# Patient Record
(demographics unavailable — no encounter records)

---

## 2017-04-25 NOTE — RAD
CT chest without IV contrast  



History:  Pleural effusion and infiltrates.



Comparison:  None.



Technique:  Helical CT of the chest was performed without intravenous

contrast.  Axial, sagittal, and coronal  reconstructions were obtained.



One or more of the following individualized dose reduction techniques were

utilized for the study:



Automated exposure control

Adjustment of mA and/or kV according to patient's size

Use of iterative reconstruction technique.



Findings:  



Thyroid is symmetric. Trachea and mainstem bronchi appear patent. Aortic

atherosclerosis is present. Coronary artery calcifications are present.

Cardiac chambers appear enlarged. No pericardial thickening is identified.



Small bilateral pleural effusions, right larger than left, are seen. Bilateral

lower lobe consolidation is favored to be atelectasis. There is no evidence of

mild septal thickening, raising possibility of mild interstitial edema.



The azygos vein is enlarged which is thought to be representative of azygos

continuation of the IVC..



Impression:  

1.  Small bilateral pleural effusions, right larger the left.

2.  Bilateral lower lobe consolidation, favored to be atelectasis.

3.  There may be component of mild interstitial edema.

## 2017-04-25 NOTE — RAD
Exam:  PA and lateral chest radiograph  



History:  Congestive heart failure.



Comparison:  3/5/2006.



Findings:  Cardiac silhouette appears enlarged. Aortic atherosclerosis is

seen. Small bilateral pleural effusions are noted, right larger than left.

There is evidence of thickening of the fissure and increased vascularity,

compatible with mild congestive heart failure.  Bilateral lower lobe densities

are seen adjacent to the pleural fluid, could be atelectasis.



Impression: Mild congestive heart failure with bilateral pleural effusions.

## 2017-04-26 NOTE — PDOC2
CARDIAC CONSULT


DATE OF CONSULT


Date of Consult


DATE: 4/26/17 


TIME: 11:24





REASON FOR CONSULT


Reason for Consult:


AFIB





REFERRING PHYSICIAN


Referring Physician:


Xander





SOURCE


Source:  Chart review, Patient





HISTORY OF PRESENT ILLNESS


HISTORY OF PRESENT ILLNESS


This is a pleasant 81 yo female admitted for complains of SOA.  Reports that in 

the last 1-2 weeks she has been developing increasing SOA and ACOSTA.  Recently 

her symptoms have been worse with notable orthopnea, dry cough, and increasing 

leg swelling. Denies any CP, palpitations, dizziness or fainting episodes. She 

has been compliant with her daily medications.  Denies any any excessive fluid 

intake.  She has chronic AFIB and is on coumadin.  She went ot her PCP 

yesterday and was told to come to the hospital due to CHF. Denies any CAD, VTE, 

syncope. Verbalized that her BP have been erratic "up and down"





PAST MEDICAL HISTORY


Cardiovascular:  AFIB, HTN, Hyperlipidemia


Pulmonary:  No pertinent hx


CENTRAL NERVOUS SYSTEM:  Other (No pertinent history)


GI:  Diverticulosis, GERD, Other (esophageal stricture)


Heme/Onc:  No pertinent hx


Hepatobiliary:  No pertinent hx


Psych:  No pertinent hx


Musculoskeletal:  Osteoarthritis


Infectious disease:  No pertinent hx


ENT:  Other (blind right eye)


Renal/:  Chronic renal insuff


Endocrine:  Diabetes (2)


Dermatology:  No pertinent hx





PAST SURGICAL HISTORY


Past Surgical History:  Cataract Removal, Other (benign colon polypectomy; 

right eye surgery; [facility] 2000 no significant lesions at that time; esophageal 

dilatation)





FAMILY HISTORY


Family History:  Coronary Artery Disease (sister), Diabetes (brother), Stroke (

sister)





SOCIAL HISTORY


Smoke:  No


ALCOHOL:  none


Drugs:  None


Lives:  with Family





CURRENT MEDICATIONS


CURRENT MEDICATIONS





Current Medications








 Medications


  (Trade)  Dose


 Ordered  Sig/Navjot


 Route


 PRN Reason  Start Time


 Stop Time Status Last Admin


Dose Admin


 


 Amlodipine


 Besylate


  (Norvasc)  10 mg  DAILY


 PO


   4/26/17 09:00


    4/26/17 08:11


 


 


 Atenolol


  (Tenormin)  50 mg  BID


 PO


   4/25/17 21:00


    4/26/17 08:11


 


 


 Atorvastatin


 Calcium


  (Lipitor)  40 mg  QHS


 PO


   4/25/17 21:00


    4/25/17 20:43


 


 


 Glipizide


  (Glucotrol)  5 mg  DAILYWBKFT


 PO


   4/26/17 08:00


    4/26/17 08:09


 


 


 Warfarin Sodium


  (Coumadin)  2 mg  DAILY16


 PO


   4/25/17 17:30


    4/25/17 18:32


 


 


 Losartan Potassium


  (Cozaar)  100 mg  DAILY


 PO


   4/26/17 09:00


    4/26/17 08:10


 


 


 Furosemide


  (Lasix)  40 mg  DAILY


 IVP


   4/26/17 09:00


    4/26/17 08:09


 


 


 Furosemide


  (Lasix)  40 mg  1X  ONCE


 IVP


   4/25/17 18:15


 4/25/17 18:16 DC 4/25/17 18:33


 


 


 Potassium Chloride


  (Klor-Con)  20 meq  DAILY10


 PO


   4/26/17 10:00


    4/26/17 10:16


 


 


 Aspirin


  (Children'S


 Aspirin)  81 mg  1X  ONCE


 PO


   4/26/17 10:30


 4/26/17 10:31 DC 4/26/17 10:26


 











ALLERGIES


ALLERGIES:  


Coded Allergies:  


     latex (Verified  Allergy, Intermediate, RASH, 4/25/17)


     felodipine (Verified  Adverse Reaction, Mild, COUGH, 4/25/17)


     lisinopril (Verified  Adverse Reaction, Mild, COUGH, 4/25/17)





ROS


Review of System


14 point ROS evaluated with pertinent positives noted per HPI





PHYSICAL EXAM


General:  Alert, Oriented X3, Cooperative, No acute distress


HEENT:  Atraumatic, Mucous membr. moist/pink


Lungs:  Other (bibasilar crackles)


Extremities:  No cyanosis, Other (1+ bilateral LE pitting edema)


Skin:  No breakdown, No significant lesion


Neuro:  Normal speech, Sensation intact


Psych/Mental Status:  Mental status NL, Mood NL


MUSCULOSKELETAL:  Osteoarthritic changes both hands





VITALS


VITALS





 Vital Signs








  Date Time  Temp Pulse Resp B/P Pulse Ox O2 Delivery O2 Flow Rate FiO2


 


4/26/17 08:11  67  132/66    


 


4/26/17 08:00      Nasal Cannula 3.0 


 


4/26/17 07:20 98.2  16  96   





 98.2       











LABS


Lab:





Laboratory Tests








Test


  4/25/17


15:30 4/25/17


16:24 4/25/17


20:26 4/26/17


06:00


 


Glucose (Fingerstick)


  91mg/dL


(70-99) 


  162mg/dL


(70-99) 


 


 


White Blood Count


  


  7.2x10^3/uL


(4.0-11.0) 


  6.3x10^3/uL


(4.0-11.0)


 


Red Blood Count


  


  3.59x10^6/uL


(3.50-5.40) 


  3.39x10^6/uL


(3.50-5.40)


 


Hemoglobin


  


  11.7g/dL


(12.0-15.5) 


  11.1g/dL


(12.0-15.5)


 


Hematocrit


  


  35.0%


(36.0-47.0) 


  32.8%


(36.0-47.0)


 


Mean Corpuscular Volume  97fL ()   97fL () 


 


Mean Corpuscular Hemoglobin  33pg (25-35)   33pg (25-35) 


 


Mean Corpuscular Hemoglobin


Concent 


  34g/dL (31-37) 


  


  34g/dL (31-37) 


 


 


Red Cell Distribution Width


  


  15.1%


(11.5-14.5) 


  15.2%


(11.5-14.5)


 


Platelet Count


  


  355x10^3/uL


(140-400) 


  307x10^3/uL


(140-400)


 


Neutrophils (%) (Auto)  71% (31-73)   62% (31-73) 


 


Lymphocytes (%) (Auto)  19% (24-48)   24% (24-48) 


 


Monocytes (%) (Auto)  8% (0-9)   12% (0-9) 


 


Eosinophils (%) (Auto)  1% (0-3)   2% (0-3) 


 


Basophils (%) (Auto)  0% (0-3)   1% (0-3) 


 


Neutrophils # (Auto)


  


  5.1x10^3uL


(1.8-7.7) 


  3.9x10^3uL


(1.8-7.7)


 


Lymphocytes # (Auto)


  


  1.4x10^3/uL


(1.0-4.8) 


  1.5x10^3/uL


(1.0-4.8)


 


Monocytes # (Auto)


  


  0.6x10^3/uL


(0.0-1.1) 


  0.7x10^3/uL


(0.0-1.1)


 


Eosinophils # (Auto)


  


  0.1x10^3/uL


(0.0-0.7) 


  0.1x10^3/uL


(0.0-0.7)


 


Basophils # (Auto)


  


  0.0x10^3/uL


(0.0-0.2) 


  0.0x10^3/uL


(0.0-0.2)


 


Prothrombin Time


  


  23.8SEC


(11.7-14.0) 


  26.2SEC


(11.7-14.0)


 


Prothromb Time International


Ratio 


  2.3 (0.8-1.1) 


  


  2.6 (0.8-1.1) 


 


 


D-Dimer (Ning)


  


  5.10ug/mlFEU


(0.00-0.50) 


  


 


 


Sodium Level


  


  141mmol/L


(136-145) 


  144mmol/L


(136-145)


 


Potassium Level


  


  3.8mmol/L


(3.5-5.1) 


  3.7mmol/L


(3.5-5.1)


 


Chloride Level


  


  105mmol/L


() 


  106mmol/L


()


 


Carbon Dioxide Level


  


  23mmol/L


(21-32) 


  28mmol/L


(21-32)


 


Anion Gap  13 (6-14)   10 (6-14) 


 


Blood Urea Nitrogen  21mg/dL (7-20)   17mg/dL (7-20) 


 


Creatinine


  


  0.9mg/dL


(0.6-1.0) 


  0.9mg/dL


(0.6-1.0)


 


Estimated GFR


(Cockcroft-Gault) 


  59.9 


  


  59.9 


 


 


BUN/Creatinine Ratio  23 (6-20)   


 


Glucose Level


  


  100mg/dL


(70-99) 


  102mg/dL


(70-99)


 


Calcium Level


  


  9.2mg/dL


(8.5-10.1) 


  8.7mg/dL


(8.5-10.1)


 


Magnesium Level


  


  1.4mg/dL


(1.8-2.4) 


  


 


 


Total Bilirubin


  


  0.9mg/dL


(0.2-1.0) 


  


 


 


Aspartate Amino Transf


(AST/SGOT) 


  19U/L (15-37) 


  


  


 


 


Alanine Aminotransferase


(ALT/SGPT) 


  23U/L (14-59) 


  


  


 


 


Alkaline Phosphatase  88U/L ()   


 


Troponin I Quantitative


  


  < 0.017ng/mL


(0.000-0.055) 


  < 0.017ng/mL


(0.000-0.055)


 


NT-Pro-B-Type Natriuretic


Peptide 


  2462pg/mL


(0-449) 


  


 


 


Total Protein


  


  7.9g/dL


(6.4-8.2) 


  


 


 


Albumin


  


  4.0g/dL


(3.4-5.0) 


  


 


 


Albumin/Globulin Ratio  1.0 (1.0-1.7)   











ASSESSMENT/PLAN


ASSESSMENT/PLAN


1. Acute CHF possible diastolic dysfunction


2. Chronic AFIB: rate controlled. INR therapeutic. Troponin series normal. Was 

not on tele overnight


3. HTN: initially labile now better. 


4. HLP: statin





Recommendations


1. Continue with atenolol and coumadin


2. Could not rule out any underlying KIEL. paroxysmal RVR episode is another 

differential but no notable symptoms corresponding to it. 


   Uncontrolled HTN is another differential.  Will await TTE result and will 

make further recommendations. 


3. Good UOP, continue with IV lasix therapy. 


4. TSH, EKG pending, replace tele


Problems:  








MARGARITA SINGER Apr 26, 2017 11:25

## 2017-04-26 NOTE — RAD
Bilateral lower extremity venous ultrasound, 4/25/2017:



History: Leg swelling



Duplex evaluation of the deep veins in the lower extremities was performed

including grayscale, color-flow and spectral Doppler analysis. The femoral and

popliteal veins demonstrate normal compressibility and normal responses to

distal augmentation maneuvers.  Color imaging of those vessels shows no

evidence of intraluminal clot.  The visualized deep veins in both calves are

patent. 



IMPRESSION:   There is no sonographic evidence of deep vein thrombosis in

either lower extremity.

## 2017-04-26 NOTE — PDOC
PULMONARY PROGRESS NOTES


Vitals





 Vital Signs








  Date Time  Temp Pulse Resp B/P Pulse Ox O2 Delivery O2 Flow Rate FiO2


 


4/26/17 15:00 96.6 73 22 137/66 97 Nasal Cannula 4.0 





 96.6       








Labs





Laboratory Tests








Test


  4/25/17


15:30 4/25/17


16:24 4/25/17


20:26 4/26/17


06:00


 


Glucose (Fingerstick)


  91mg/dL


(70-99) 


  162mg/dL


(70-99) 


 


 


White Blood Count


  


  7.2x10^3/uL


(4.0-11.0) 


  6.3x10^3/uL


(4.0-11.0)


 


Red Blood Count


  


  3.59x10^6/uL


(3.50-5.40) 


  3.39x10^6/uL


(3.50-5.40)


 


Hemoglobin


  


  11.7g/dL


(12.0-15.5) 


  11.1g/dL


(12.0-15.5)


 


Hematocrit


  


  35.0%


(36.0-47.0) 


  32.8%


(36.0-47.0)


 


Mean Corpuscular Volume  97fL ()   97fL () 


 


Mean Corpuscular Hemoglobin  33pg (25-35)   33pg (25-35) 


 


Mean Corpuscular Hemoglobin


Concent 


  34g/dL (31-37) 


  


  34g/dL (31-37) 


 


 


Red Cell Distribution Width


  


  15.1%


(11.5-14.5) 


  15.2%


(11.5-14.5)


 


Platelet Count


  


  355x10^3/uL


(140-400) 


  307x10^3/uL


(140-400)


 


Neutrophils (%) (Auto)  71% (31-73)   62% (31-73) 


 


Lymphocytes (%) (Auto)  19% (24-48)   24% (24-48) 


 


Monocytes (%) (Auto)  8% (0-9)   12% (0-9) 


 


Eosinophils (%) (Auto)  1% (0-3)   2% (0-3) 


 


Basophils (%) (Auto)  0% (0-3)   1% (0-3) 


 


Neutrophils # (Auto)


  


  5.1x10^3uL


(1.8-7.7) 


  3.9x10^3uL


(1.8-7.7)


 


Lymphocytes # (Auto)


  


  1.4x10^3/uL


(1.0-4.8) 


  1.5x10^3/uL


(1.0-4.8)


 


Monocytes # (Auto)


  


  0.6x10^3/uL


(0.0-1.1) 


  0.7x10^3/uL


(0.0-1.1)


 


Eosinophils # (Auto)


  


  0.1x10^3/uL


(0.0-0.7) 


  0.1x10^3/uL


(0.0-0.7)


 


Basophils # (Auto)


  


  0.0x10^3/uL


(0.0-0.2) 


  0.0x10^3/uL


(0.0-0.2)


 


Prothrombin Time


  


  23.8SEC


(11.7-14.0) 


  26.2SEC


(11.7-14.0)


 


Prothromb Time International


Ratio 


  2.3 (0.8-1.1) 


  


  2.6 (0.8-1.1) 


 


 


D-Dimer (Ning)


  


  5.10ug/mlFEU


(0.00-0.50) 


  


 


 


Sodium Level


  


  141mmol/L


(136-145) 


  144mmol/L


(136-145)


 


Potassium Level


  


  3.8mmol/L


(3.5-5.1) 


  3.7mmol/L


(3.5-5.1)


 


Chloride Level


  


  105mmol/L


() 


  106mmol/L


()


 


Carbon Dioxide Level


  


  23mmol/L


(21-32) 


  28mmol/L


(21-32)


 


Anion Gap  13 (6-14)   10 (6-14) 


 


Blood Urea Nitrogen  21mg/dL (7-20)   17mg/dL (7-20) 


 


Creatinine


  


  0.9mg/dL


(0.6-1.0) 


  0.9mg/dL


(0.6-1.0)


 


Estimated GFR


(Cockcroft-Gault) 


  59.9 


  


  59.9 


 


 


BUN/Creatinine Ratio  23 (6-20)   


 


Glucose Level


  


  100mg/dL


(70-99) 


  102mg/dL


(70-99)


 


Calcium Level


  


  9.2mg/dL


(8.5-10.1) 


  8.7mg/dL


(8.5-10.1)


 


Magnesium Level


  


  1.4mg/dL


(1.8-2.4) 


  


 


 


Total Bilirubin


  


  0.9mg/dL


(0.2-1.0) 


  


 


 


Aspartate Amino Transf


(AST/SGOT) 


  19U/L (15-37) 


  


  


 


 


Alanine Aminotransferase


(ALT/SGPT) 


  23U/L (14-59) 


  


  


 


 


Alkaline Phosphatase  88U/L ()   


 


Troponin I Quantitative


  


  < 0.017ng/mL


(0.000-0.055) 


  < 0.017ng/mL


(0.000-0.055)


 


NT-Pro-B-Type Natriuretic


Peptide 


  2462pg/mL


(0-449) 


  


 


 


Total Protein


  


  7.9g/dL


(6.4-8.2) 


  


 


 


Albumin


  


  4.0g/dL


(3.4-5.0) 


  


 


 


Albumin/Globulin Ratio  1.0 (1.0-1.7)   














Test


  4/26/17


08:07 4/26/17


11:36 4/26/17


15:00 4/26/17


16:11


 


Glucose (Fingerstick)


  96mg/dL


(70-99) 118mg/dL


(70-99) 


  94mg/dL


(70-99)


 


Troponin I Quantitative


  


  


  < 0.017ng/mL


(0.000-0.055) 


 








Laboratory Tests








Test


  4/25/17


20:26 4/26/17


06:00 4/26/17


08:07 4/26/17


11:36


 


Glucose (Fingerstick)


  162mg/dL


(70-99) 


  96mg/dL


(70-99) 118mg/dL


(70-99)


 


White Blood Count


  


  6.3x10^3/uL


(4.0-11.0) 


  


 


 


Red Blood Count


  


  3.39x10^6/uL


(3.50-5.40) 


  


 


 


Hemoglobin


  


  11.1g/dL


(12.0-15.5) 


  


 


 


Hematocrit


  


  32.8%


(36.0-47.0) 


  


 


 


Mean Corpuscular Volume  97fL ()   


 


Mean Corpuscular Hemoglobin  33pg (25-35)   


 


Mean Corpuscular Hemoglobin


Concent 


  34g/dL (31-37) 


  


  


 


 


Red Cell Distribution Width


  


  15.2%


(11.5-14.5) 


  


 


 


Platelet Count


  


  307x10^3/uL


(140-400) 


  


 


 


Neutrophils (%) (Auto)  62% (31-73)   


 


Lymphocytes (%) (Auto)  24% (24-48)   


 


Monocytes (%) (Auto)  12% (0-9)   


 


Eosinophils (%) (Auto)  2% (0-3)   


 


Basophils (%) (Auto)  1% (0-3)   


 


Neutrophils # (Auto)


  


  3.9x10^3uL


(1.8-7.7) 


  


 


 


Lymphocytes # (Auto)


  


  1.5x10^3/uL


(1.0-4.8) 


  


 


 


Monocytes # (Auto)


  


  0.7x10^3/uL


(0.0-1.1) 


  


 


 


Eosinophils # (Auto)


  


  0.1x10^3/uL


(0.0-0.7) 


  


 


 


Basophils # (Auto)


  


  0.0x10^3/uL


(0.0-0.2) 


  


 


 


Prothrombin Time


  


  26.2SEC


(11.7-14.0) 


  


 


 


Prothromb Time International


Ratio 


  2.6 (0.8-1.1) 


  


  


 


 


Sodium Level


  


  144mmol/L


(136-145) 


  


 


 


Potassium Level


  


  3.7mmol/L


(3.5-5.1) 


  


 


 


Chloride Level


  


  106mmol/L


() 


  


 


 


Carbon Dioxide Level


  


  28mmol/L


(21-32) 


  


 


 


Anion Gap  10 (6-14)   


 


Blood Urea Nitrogen  17mg/dL (7-20)   


 


Creatinine


  


  0.9mg/dL


(0.6-1.0) 


  


 


 


Estimated GFR


(Cockcroft-Gault) 


  59.9 


  


  


 


 


Glucose Level


  


  102mg/dL


(70-99) 


  


 


 


Calcium Level


  


  8.7mg/dL


(8.5-10.1) 


  


 


 


Troponin I Quantitative


  


  < 0.017ng/mL


(0.000-0.055) 


  


 














Test


  4/26/17


15:00 4/26/17


16:11 


  


 


 


Troponin I Quantitative


  < 0.017ng/mL


(0.000-0.055) 


  


  


 


 


Glucose (Fingerstick)


  


  94mg/dL


(70-99) 


  


 








Medications





Active Scripts








 Medications  Dose


 Route/Sig Days Date Category Dose


Instructions


 


 Tylenol


  (Acetaminophen)


 325 Mg Tablet  1-2 Tab


 PO PRN Q4HRS   4/25/17 Reported 


 


 Coumadin


  (Warfarin Sodium)


 2 Mg Tablet  2 Mg


 PO DAILY   4/25/17 Reported 


 


 Atorvastatin


 Calcium 40 Mg


 Tablet  40 Mg


 PO DAILY   4/25/17 Reported 


 


 Losartan


 Potassium 100 Mg


 Tablet  100 Mg


 PO DAILY   4/25/17 Reported 


 


 Glipizide 5 Mg


 Tablet  5 Mg


 PO DAILY   4/25/17 Reported 


 


 Atenolol 50 Mg


 Tablet  50 Mg


 PO BID   4/25/17 Reported 


 


 Amlodipine


 Besylate 10 Mg


 Tablet  10 Mg


 PO DAILY   4/25/17 Reported 


 


 Furosemide 40


 Mg/4 Ml Solution  40 Mg


 INJ DAILY   4/25/17 Reported  FIRST DOSE NOW











Impression


.


FULL NOTE DICTATED


THANKS


AGREE WITH CURRENT RX


NEEDS AN OUTPT SLEEP STUDY








FRANK CHEN MD Apr 26, 2017 17:33

## 2017-04-26 NOTE — PDOC
Provider Note


Provider Note


history and physical dictated  #  [id number]








ADRIANA TENORIO MD Apr 26, 2017 09:33

## 2017-04-26 NOTE — CARD
--------------- APPROVED REPORT --------------





EXAM: Two-dimensional and M-mode echocardiogram with Doppler and color Doppler.



Other Information 

Quality : AverageHR: 69bpm

Rhythm : Atrial Fibrillation



INDICATION

Congestive Heart Failure 



RISK FACTORS

Obesity   



2D DIMENSIONS 

RVDd3.2 (2.9-3.5cm)Left Atrium(2D)4.3 (1.6-4.0cm)

IVSd1.1 (0.7-1.1cm)Aortic Root(2D)2.8 (2.0-3.7cm)

LVDd4.4 (3.9-5.9cm)LVOT Diameter2.0 (1.8-2.4cm)

PWd1.0 (0.7-1.1cm)LVDs3.2 (2.5-4.0cm)

FS (%) 27.5 %SV47.1 ml

LVEF(%)53.6 (>50%)



Aortic Valve

AoV Peak Berry.146.1cm/sAoV VTI31.3cm

AO Peak GR.8.5mmHgLVOT Peak Berry.90.0cm/s

LVOT  VTI 20.44cmAO Mean GR.5mmHg

MELO (VMAX)1.38ox8KPH   (VTI)2.09cm2



Mitral Valve

MV E Cwesvetv259.8cm/sMV DECEL [id number]

MV E Mean Gr.2mmHgMV [id number]

MVA (PHT)3.84cm2



Pulmonary Valve

PV Peak Mvwuhrjr94.7cm/sPV Peak Grad.3mmHg

RVOT VTI10.5cm



Tricuspid Valve

TR P. Kmbwarcz279nk/sRAP [id number]

TR Peak Gr.94xgUePFVR35weIn



 LEFT VENTRICLE 

The left ventricle is normal size. There is normal left ventricular wall thickness. Left ventricle sy
stolic function is normal. The Ejection Fraction is 50-55%. There is normal LV segmental wall motion.
 Tissue Doppler imaging reveals moderate left ventricular diastolic dysfunction. There is no ventricu
lar septal defect visualized.



 RIGHT VENTRICLE 

The right ventricle is mildly dilated. There is normal right ventricular wall thickness. The right ve
ntricular systolic function is normal.



 ATRIA 

The left atrium is mildly dilated. The right atrium is moderately dilated. The interatrial septum is 
intact with no evidence for an atrial septal defect or patent foramen ovale as noted on 2-D or Dopple
r imaging.



 AORTIC VALVE 

The aortic valve is normal in structure and function. Doppler and Color Flow revealed no significant 
aortic regurgitation. There is no significant aortic valvular stenosis.



 MITRAL VALVE 

Mitral annular calcification is mild. The mitral valve leaflets are calcified but open well. There is
 no evidence of mitral valve prolapse. There is no mitral valve stenosis. Doppler and Color Flow reve
aled mild mitral regurgitation.



 TRICUSPID VALVE 

Doppler and Color Flow revealed moderate tricuspid regurgitation. The PA pressure was estimated at 73
 mmHg.There is severe pulmonary hypertension.



 PULMONIC VALVE 

Doppler and Color Flow revealed no pulmonic valvular regurgitation. There is no pulmonic valvular luis
nosis.



 GREAT VESSELS 

The aortic root is normal in size. The ascending aorta is normal in size. The IVC is normal in size a
nd collapses >50% with inspiration.



 PERICARDIAL EFFUSION 

There is no pleural effusion. There is no evidence of significant pericardial effusion.



Critical Notification

Critical Value: No



<Conclusion>

Left ventricle systolic function is normal. The Ejection Fraction is 50-55%.

There is normal LV segmental wall motion.

Tissue Doppler imaging reveals moderate left ventricular diastolic dysfunction.

Doppler and Color Flow revealed moderate tricuspid regurgitation. The PA pressure was estimated at 73
 mmHg.There is severe pulmonary hypertension.

## 2017-04-26 NOTE — EKG
[facility]

              [address] [place] [address]

Test Date:    2017               Test Time:    13:32:09

Pat Name:     JUVE MIRZA               Department:   

Patient ID:   [id number]           Room:         [facility]

Gender:       F                        Technician:   ALLA

:          1934               Requested By: ADRIANA TENORIO

Order Number: [id number]            Reading MD:   Fadi Wisdom

                                 Measurements

Intervals                              Axis          

Rate:         68                       P:            

NH:                                    QRS:          7

QRSD:         88                       T:            -19

QT:           422                                    

QTc:          454                                    

                           Interpretive Statements

ATRIAL FIBRILLATION WITH CONTROLLED VENTRICULAR RESPONSE

NON-SPECIFIC ST/T CHANGES

Electronically Signed On 2017 8:30:46 CDT by Fadi Wisdom

## 2017-04-26 NOTE — ACF
Admit Criteria Forms


 HEART FAILURE: COMMON COMPLICATIONS 





Clinical Indications for Inpatient Care





                                                                     (Place 'X' 

for any and all applicable criteria):


           


Ongoing inpatient care may be indicated for heart failure with ANY ONE of the 

following (1)(2)(3)(4)(5):


[ ]I.   Ongoing need for care for primary condition requiring frequent therapy 

adjustments because of 


        changes in cardiac function (eg, drug dosage changes for drugs that are

   renally metabolized)


[ ]II.   New-onset heart failure


[ ]III.  Heart failure with decreased urine output not responsive to attempts 

to optimize volume status


[ ]IV. Acute cardiac ischemia causing or associated with failure


[X]V.  Complications of heart failure, including  ANY ONE of the following:    

                         


        [ ]a)   Pericardial effusion


        [ ]b)   Symptomatic pleural effusion           


        [ ]c)   O2 saturation <90%  or PO2  < 60 mm Hg (8.0 kPa) on room air  

or require baseline supplemental O2


        [ ]d)   Tachypnea                                         


        [X]e)   Dyspnea


        [ ]f)    Syncope


        [ ]g)   Change in mental status


        [ ]h)   Acute renal insufficiency that is severe (reduction of more 

than 50% in estimated glomerular 


                 filtration rate from baseline) or progressive reduction of 

more than 25% in estimated glomerular 


                 filtration rate from baseline, with creatinine continuing to 

rise)


        [ ]i)    Hemodynamic instability


        [ ]j)    Anasarca


        [ ]k)   Clinically significant metabolic abnormalities due to heart 

failure (eg, new-onset metabolic acidosis)











Extended stay beyond goal length of stay for primary condition may be needed 

until ALL of the 


following are present(1)(3):


[ ]a)    Stable and effective diuretic regimen established (or patient on 

stable dialysis regimen if in chronic renal failure)


[ ]b)    Breathing comfortably at rest


[ ]c)    Saturation of arterial oxygen greater than 90% or at acceptable 

baseline


[ ]d)    Pulmonary edema absent or improved                   


[ ]e)    Hemodynamic stability 


[ ]f)     Volume status acceptable on oral medication     


[ ]g)    Peripheral or sacral edema absent or improved


[ ]h)    Renal function stable and manageable at a lower level of care


[ ]i)     Complications (eg, pleural effusion) resolved or manageable at a 

lower level of care


[ ]j)     Patient or caregiver has received written discharge instructions or 

educational material addressing activity 


          level, diet, discharge medications, follow-up  appointment, weight 

monitoring, and what to do if symptoms worsen











The original [employer] content created by [employer] has been revised.


The portions of the content which have been revised are identified through the 

use of italic text or in bold, and [facility] 


has neither reviewed nor approved the modified material.All other unmodified 

content is copyright  [facility] [employer].





Please see references footnoted in the original [facility] [employer] edition 

2016








BELIA BENAVIDEZ Apr 26, 2017 10:54

## 2017-04-26 NOTE — HP
ADMIT DATE:  04/25/2017



LOCATION:  Room# 530.



HISTORY OF PRESENT ILLNESS:  The patient is an 82-year-old white female with

history of persistent atrial fibrillation treated with Coumadin, also has

diabetes mellitus type 2 with microalbuminuria and history of hypertension, who

was admitted to [facility] from my office on 04/25/2017 with a

1-week history of dyspnea on exertion and orthopnea and leg edema.  The patient

was seen in the office last week and was started on the hydrochlorothiazide 25

mg every day, which she took in the past and had a chest x-ray done which showed

evidence of pleural effusions and infiltrates in the bases.  She only had an

occasional cough and no fever.  Her symptoms persisted prompting admission to

the hospital with suspected acute congestive heart failure.  She received Lasix

40 mg IV after admission yesterday.  She feels a little bit better.



ALLERGIES:  INCLUDE FELODIPINE, LATEX, AND LISINOPRIL CAUSED COUGH IN THE PAST.



MEDICATIONS:  Include Coumadin 2 mg every day, amlodipine 10 mg every day,

atenolol 50 mg b.i.d., glipizide 5 mg every day, hydrochlorothiazide 25 mg every

day, losartan 100 mg every day, lovastatin 40 mg b.i.d.  She also takes

metformin 500 mg 2 at breakfast and 1 at supper, Prilosec 20 mg every day

p.r.n., tramadol 50 mg every 6 hours p.r.n.



PAST MEDICAL HISTORY:  Significant for diabetes mellitus type 2 with

microalbuminuria, hypertension, and persistent atrial fibrillation, on Coumadin.

 She also has a history of a cataract extraction, benign colon polypectomy,

vitrectomy in the right eye in 2009, esophageal dilatation in 2002.  She had a

cardiac catheterization showing normal coronary arteries in 2000.  She has a

history of diverticulosis and hyperlipidemia.



SOCIAL HISTORY:  She does not drink alcohol nor does she smoke cigarettes.  She

is .



FAMILY HISTORY:  Brother had diabetes mellitus.  Mother had breast cancer. 

Sister had a stroke, diabetes mellitus, pancreatic cancer, and coronary artery

disease.



REVIEW OF SYSTEMS:

GENERAL:  There has been no fever, chills, or sweats.

CARDIOVASCULAR:  Denies any chest pain.

PULMONARY:  She has a shortness of breath with exertion.  No orthopnea.

ENDOCRINE:  She has diabetes mellitus.

SKIN:  No rashes.

NEUROLOGIC:  No focal weakness.

The rest of systems reviewed are negative except as stated in history of present

illness.



PHYSICAL EXAMINATION:

VITAL SIGNS:  Temperature is 98.2 degrees, apical pulse is irregular at 67,

respiratory rate is 16, blood pressure is 132/66, oxygen saturation 96% on 2

liters per nasal cannula.

HEENT:  Eyes:  Gaze is conjugate.  Mouth:  Tongue is midline.

NECK:  There is no cervical lymphadenopathy or thyroid enlargement.

HEART:  Reveals an S1, S2.  There is no S3 or murmur.

LUNGS:  Reveal some fine crackles in the bases.  Decreased breath sounds in the

bases.

ABDOMEN:  Soft, nontender.

EXTREMITIES:  Lower extremities without edema.

SKIN:  No rashes.

NEUROLOGIC:  Reveals that she is coherent.  No focal weakness in the arms or

legs.



LABORATORY DATA:  White count 9.3; hemoglobin 11.1; platelet count 307,000; 62

polys; and 24 lymphocytes.  Sodium 134, potassium 3.7, chloride 106, total CO2

of 28, BUN 10, creatinine 0.7, blood sugar 102.  ProBNP was increased to 2462. 

Troponin levels negative x 2.  INR today is 2.6.  She had a CAT scan of the

chest done yesterday without contrast, which showed small bilateral pleural

effusions, larger on the right than the left.  Bilateral lower lobe

consolidation, thought to be secondary to atelectasis and mild interstitial

edema.  It did show coronary artery calcifications.  She also had a chest x-ray

done yesterday showed mild congestive heart failure, bilateral pleural

effusions.  She also had a venous Doppler of both lower extremities which was

negative for deep vein thrombosis.  EKG was ordered, but I do not see it on the

chart.



ASSESSMENT:

1.  Acute congestive heart failure.  The echocardiogram will show off if it is

systolic versus diastolic or combined

2.  Coronary artery disease manifested by coronary calcifications.

3.  Persistent atrial fibrillation, on Coumadin.

4.  Hypertension.

5.  Hyperlipidemia.

6.  Diabetes mellitus type 2 with microalbuminuria.

7.  Bilateral pleural effusions, most likely secondary to congestive heart

failure.

8.  Bilateral lower lobe atelectasis, most likely secondary to pleural

effusions.



PLAN:  At this time is to continue with IV Lasix.  Follow her intake and output.

 We will order some potassium chloride.  We will continue with the atenolol and

losartan.  We will order atorvastatin since lovastatin is not on formulary.  We

will consult cardiology and pulmonary.  Continue her glipizide.  Discontinue the

metformin _____ congestive heart failure.  An echocardiogram has been ordered. 

Started on aspirin 81 mg every day for coronary artery disease.  We will recheck

her labs again tomorrow.  She had a pretty good diuresis of 2600 mL with a

single dose of Lasix yesterday.  Of course, we will discontinue the

hydrochlorothiazide.

 



______________________________

ADRIANA TENORIO MD



DR:  CEASAR/hilario  JOB#:  [id number] / [id number]

DD:  04/26/2017 09:32  DT:  04/26/2017 10:44

## 2017-04-27 NOTE — PDOC
PULMONARY PROGRESS NOTES


Subjective


PT FEELS BETTER


Vitals





 Vital Signs








  Date Time  Temp Pulse Resp B/P Pulse Ox O2 Delivery O2 Flow Rate FiO2


 


4/27/17 15:00 96.8 78 20 132/65 96 Nasal Cannula 2.0 





 96.8       








General:  Alert


Lungs:  Clear, Crackles


Cardiovascular:  S1, S2


Abdomen:  Soft


Neuro Exam:  Alert


Extremities:  Other (EDEMA)


Skin:  Warm


Labs





Laboratory Tests








Test


  4/25/17


20:26 4/26/17


06:00 4/26/17


08:07 4/26/17


11:36


 


Glucose (Fingerstick)


  162mg/dL


(70-99) 


  96mg/dL


(70-99) 118mg/dL


(70-99)


 


White Blood Count


  


  6.3x10^3/uL


(4.0-11.0) 


  


 


 


Red Blood Count


  


  3.39x10^6/uL


(3.50-5.40) 


  


 


 


Hemoglobin


  


  11.1g/dL


(12.0-15.5) 


  


 


 


Hematocrit


  


  32.8%


(36.0-47.0) 


  


 


 


Mean Corpuscular Volume  97fL ()   


 


Mean Corpuscular Hemoglobin  33pg (25-35)   


 


Mean Corpuscular Hemoglobin


Concent 


  34g/dL (31-37) 


  


  


 


 


Red Cell Distribution Width


  


  15.2%


(11.5-14.5) 


  


 


 


Platelet Count


  


  307x10^3/uL


(140-400) 


  


 


 


Neutrophils (%) (Auto)  62% (31-73)   


 


Lymphocytes (%) (Auto)  24% (24-48)   


 


Monocytes (%) (Auto)  12% (0-9)   


 


Eosinophils (%) (Auto)  2% (0-3)   


 


Basophils (%) (Auto)  1% (0-3)   


 


Neutrophils # (Auto)


  


  3.9x10^3uL


(1.8-7.7) 


  


 


 


Lymphocytes # (Auto)


  


  1.5x10^3/uL


(1.0-4.8) 


  


 


 


Monocytes # (Auto)


  


  0.7x10^3/uL


(0.0-1.1) 


  


 


 


Eosinophils # (Auto)


  


  0.1x10^3/uL


(0.0-0.7) 


  


 


 


Basophils # (Auto)


  


  0.0x10^3/uL


(0.0-0.2) 


  


 


 


Prothrombin Time


  


  26.2SEC


(11.7-14.0) 


  


 


 


Prothromb Time International


Ratio 


  2.6 (0.8-1.1) 


  


  


 


 


Sodium Level


  


  144mmol/L


(136-145) 


  


 


 


Potassium Level


  


  3.7mmol/L


(3.5-5.1) 


  


 


 


Chloride Level


  


  106mmol/L


() 


  


 


 


Carbon Dioxide Level


  


  28mmol/L


(21-32) 


  


 


 


Anion Gap  10 (6-14)   


 


Blood Urea Nitrogen  17mg/dL (7-20)   


 


Creatinine


  


  0.9mg/dL


(0.6-1.0) 


  


 


 


Estimated GFR


(Cockcroft-Gault) 


  59.9 


  


  


 


 


Glucose Level


  


  102mg/dL


(70-99) 


  


 


 


Calcium Level


  


  8.7mg/dL


(8.5-10.1) 


  


 


 


Troponin I Quantitative


  


  < 0.017ng/mL


(0.000-0.055) 


  


 














Test


  4/26/17


15:00 4/26/17


16:11 4/27/17


04:30 4/27/17


07:33


 


Troponin I Quantitative


  < 0.017ng/mL


(0.000-0.055) 


  


  


 


 


Glucose (Fingerstick)


  


  94mg/dL


(70-99) 


  101mg/dL


(70-99)


 


White Blood Count


  


  


  5.9x10^3/uL


(4.0-11.0) 


 


 


Red Blood Count


  


  


  3.47x10^6/uL


(3.50-5.40) 


 


 


Hemoglobin


  


  


  11.4g/dL


(12.0-15.5) 


 


 


Hematocrit


  


  


  33.4%


(36.0-47.0) 


 


 


Mean Corpuscular Volume   96fL ()  


 


Mean Corpuscular Hemoglobin   33pg (25-35)  


 


Mean Corpuscular Hemoglobin


Concent 


  


  34g/dL (31-37) 


  


 


 


Red Cell Distribution Width


  


  


  14.8%


(11.5-14.5) 


 


 


Platelet Count


  


  


  297x10^3/uL


(140-400) 


 


 


Neutrophils (%) (Auto)   62% (31-73)  


 


Lymphocytes (%) (Auto)   23% (24-48)  


 


Monocytes (%) (Auto)   13% (0-9)  


 


Eosinophils (%) (Auto)   1% (0-3)  


 


Basophils (%) (Auto)   0% (0-3)  


 


Neutrophils # (Auto)


  


  


  3.7x10^3uL


(1.8-7.7) 


 


 


Lymphocytes # (Auto)


  


  


  1.4x10^3/uL


(1.0-4.8) 


 


 


Monocytes # (Auto)


  


  


  0.7x10^3/uL


(0.0-1.1) 


 


 


Eosinophils # (Auto)


  


  


  0.1x10^3/uL


(0.0-0.7) 


 


 


Basophils # (Auto)


  


  


  0.0x10^3/uL


(0.0-0.2) 


 


 


Prothrombin Time


  


  


  27.0SEC


(11.7-14.0) 


 


 


Prothromb Time International


Ratio 


  


  2.7 (0.8-1.1) 


  


 


 


Sodium Level


  


  


  141mmol/L


(136-145) 


 


 


Potassium Level


  


  


  3.3mmol/L


(3.5-5.1) 


 


 


Chloride Level


  


  


  106mmol/L


() 


 


 


Carbon Dioxide Level


  


  


  28mmol/L


(21-32) 


 


 


Anion Gap   7 (6-14)  


 


Blood Urea Nitrogen   16mg/dL (7-20)  


 


Creatinine


  


  


  0.9mg/dL


(0.6-1.0) 


 


 


Estimated GFR


(Cockcroft-Gault) 


  


  59.9 


  


 


 


Glucose Level


  


  


  93mg/dL


(70-99) 


 


 


Calcium Level


  


  


  8.5mg/dL


(8.5-10.1) 


 


 


Magnesium Level


  


  


  1.5mg/dL


(1.8-2.4) 


 


 


Triglycerides Level


  


  


  79mg/dL


(0-150) 


 


 


Cholesterol Level


  


  


  129mg/dL


(0-200) 


 


 


LDL Cholesterol, Calculated


  


  


  74mg/dL


(0-100) 


 


 


VLDL Cholesterol, Calculated   16mg/dL (0-40)  


 


Non-HDL Cholesterol Calculated


  


  


  90mg/dL


(0-129) 


 


 


HDL Cholesterol


  


  


  39mg/dL


(40-60) 


 


 


Cholesterol/HDL Ratio   3.3  


 


Thyroid Stimulating Hormone


(TSH) 


  


  2.026uIU/mL


(0.358-3.74) 


 














Test


  4/27/17


11:15 4/27/17


16:14 


  


 


 


Glucose (Fingerstick)


  153mg/dL


(70-99) 79mg/dL


(70-99) 


  


 








Laboratory Tests








Test


  4/27/17


04:30 4/27/17


07:33 4/27/17


11:15 4/27/17


16:14


 


White Blood Count


  5.9x10^3/uL


(4.0-11.0) 


  


  


 


 


Red Blood Count


  3.47x10^6/uL


(3.50-5.40) 


  


  


 


 


Hemoglobin


  11.4g/dL


(12.0-15.5) 


  


  


 


 


Hematocrit


  33.4%


(36.0-47.0) 


  


  


 


 


Mean Corpuscular Volume 96fL ()    


 


Mean Corpuscular Hemoglobin 33pg (25-35)    


 


Mean Corpuscular Hemoglobin


Concent 34g/dL (31-37) 


  


  


  


 


 


Red Cell Distribution Width


  14.8%


(11.5-14.5) 


  


  


 


 


Platelet Count


  297x10^3/uL


(140-400) 


  


  


 


 


Neutrophils (%) (Auto) 62% (31-73)    


 


Lymphocytes (%) (Auto) 23% (24-48)    


 


Monocytes (%) (Auto) 13% (0-9)    


 


Eosinophils (%) (Auto) 1% (0-3)    


 


Basophils (%) (Auto) 0% (0-3)    


 


Neutrophils # (Auto)


  3.7x10^3uL


(1.8-7.7) 


  


  


 


 


Lymphocytes # (Auto)


  1.4x10^3/uL


(1.0-4.8) 


  


  


 


 


Monocytes # (Auto)


  0.7x10^3/uL


(0.0-1.1) 


  


  


 


 


Eosinophils # (Auto)


  0.1x10^3/uL


(0.0-0.7) 


  


  


 


 


Basophils # (Auto)


  0.0x10^3/uL


(0.0-0.2) 


  


  


 


 


Prothrombin Time


  27.0SEC


(11.7-14.0) 


  


  


 


 


Prothromb Time International


Ratio 2.7 (0.8-1.1) 


  


  


  


 


 


Sodium Level


  141mmol/L


(136-145) 


  


  


 


 


Potassium Level


  3.3mmol/L


(3.5-5.1) 


  


  


 


 


Chloride Level


  106mmol/L


() 


  


  


 


 


Carbon Dioxide Level


  28mmol/L


(21-32) 


  


  


 


 


Anion Gap 7 (6-14)    


 


Blood Urea Nitrogen 16mg/dL (7-20)    


 


Creatinine


  0.9mg/dL


(0.6-1.0) 


  


  


 


 


Estimated GFR


(Cockcroft-Gault) 59.9 


  


  


  


 


 


Glucose Level


  93mg/dL


(70-99) 


  


  


 


 


Calcium Level


  8.5mg/dL


(8.5-10.1) 


  


  


 


 


Magnesium Level


  1.5mg/dL


(1.8-2.4) 


  


  


 


 


Triglycerides Level


  79mg/dL


(0-150) 


  


  


 


 


Cholesterol Level


  129mg/dL


(0-200) 


  


  


 


 


LDL Cholesterol, Calculated


  74mg/dL


(0-100) 


  


  


 


 


VLDL Cholesterol, Calculated 16mg/dL (0-40)    


 


Non-HDL Cholesterol Calculated


  90mg/dL


(0-129) 


  


  


 


 


HDL Cholesterol


  39mg/dL


(40-60) 


  


  


 


 


Cholesterol/HDL Ratio 3.3    


 


Thyroid Stimulating Hormone


(TSH) 2.026uIU/mL


(0.358-3.74) 


  


  


 


 


Glucose (Fingerstick)


  


  101mg/dL


(70-99) 153mg/dL


(70-99) 79mg/dL


(70-99)








Medications





Active Scripts








 Medications  Dose


 Route/Sig Days Date Category Dose


Instructions


 


 Tylenol


  (Acetaminophen)


 325 Mg Tablet  1-2 Tab


 PO PRN Q4HRS   4/25/17 Reported 


 


 Coumadin


  (Warfarin Sodium)


 2 Mg Tablet  2 Mg


 PO DAILY   4/25/17 Reported 


 


 Atorvastatin


 Calcium 40 Mg


 Tablet  40 Mg


 PO DAILY   4/25/17 Reported 


 


 Losartan


 Potassium 100 Mg


 Tablet  100 Mg


 PO DAILY   4/25/17 Reported 


 


 Glipizide 5 Mg


 Tablet  5 Mg


 PO DAILY   4/25/17 Reported 


 


 Atenolol 50 Mg


 Tablet  50 Mg


 PO BID   4/25/17 Reported 


 


 Amlodipine


 Besylate 10 Mg


 Tablet  10 Mg


 PO DAILY   4/25/17 Reported 


 


 Furosemide 40


 Mg/4 Ml Solution  40 Mg


 INJ DAILY   4/25/17 Reported  FIRST DOSE NOW











Impression


.





IMPRESSION:


1.  Acute respiratory failure secondary to acute heart failure mostly diastolic.


 Echocardiogram reveals normal systolic function.


2.  Secondary pulmonary hypertension.


3.  Suspect obstructive sleep apnea.


4.  Persistent atrial fibrillation.


5.  Hyperlipidemia.


6.  Type 2 diabetes.


7.  Abnormal x-ray compatible with congestive heart failure infusions.





Plan


.


OUT PT SLEEP STUDY


DIURESE


6 MINUTE WALK


AT TIMES ECHO OVERESTIMATES THE PAP


MAY NEED A RIGHT HEART CATH TO CONFIRM PAP








FRANK CHEN MD Apr 27, 2017 20:09

## 2017-04-27 NOTE — PDOC
PROGRESS NOTES


Subjective


Subjective


feels better. good diuresis. lab reviewed. potassium and magnesium are low.  

echo discussed. has diastolic dysfunction with severe pulmonary hypertension.





Objective


Objective





 Vital Signs








  Date Time  Temp Pulse Resp B/P Pulse Ox O2 Delivery O2 Flow Rate FiO2


 


4/27/17 09:01  62  145/60    


 


4/27/17 07:00 96.3  20  91 Nasal Cannula 2.0 





 96.3       














 Intake and Output 


 


 4/27/17





 06:59


 


Intake Total 900 ml


 


Output Total 2000 ml


 


Balance -1100 ml


 


 


 


Intake Oral 900 ml


 


Output Urine Total 2000 ml


 


# Voids 7











Physical Exam


Abdomen:  Soft


Heart:  Regular rate, Normal S1, Normal S2


Extremities:  No edema


General:  Alert


HEENT:  Atraumatic


Lungs:  Clear to auscultation, Other (decreased breath sounds in bases)


Neuro:  Normal speech


Psych/Mental Status:  Mental status NL


Skin:  No rashes





Assessment


Assessment


1.  Acute diastolic congestive heart failure


2.  Coronary artery disease manifested by coronary calcifications on ct chest


3.  Persistent atrial fibrillation, on Coumadin.


4.  Hypertension.


5.  Hyperlipidemia.


6.  Diabetes mellitus type 2 with microalbuminuria.


7.  Bilateral pleural effusions, most likely secondary to congestive heart


failure.


8.  Bilateral lower lobe atelectasis, most likely secondary to pleural


effusions


    severe pulmonary hypertension. rule out KIEL other cause


    hypokalemia


    hypomagnesemia





Plan


Plan of Care


continue iv lasix


replete kcl and iv magnesium today


continue oxygen


out patient sleep study


continue lovenox for dvt prophylaxis


continue aspirin and atenolol





Comment


Review of Relevant


I have reviewed the following items arianna (where applicable) has been applied.


Labs





Laboratory Tests








Test


  4/25/17


15:30 4/25/17


16:24 4/25/17


20:26 4/26/17


06:00


 


Glucose (Fingerstick)


  91mg/dL


(70-99) 


  162mg/dL


(70-99) 


 


 


White Blood Count


  


  7.2x10^3/uL


(4.0-11.0) 


  6.3x10^3/uL


(4.0-11.0)


 


Red Blood Count


  


  3.59x10^6/uL


(3.50-5.40) 


  3.39x10^6/uL


(3.50-5.40)


 


Hemoglobin


  


  11.7g/dL


(12.0-15.5) 


  11.1g/dL


(12.0-15.5)


 


Hematocrit


  


  35.0%


(36.0-47.0) 


  32.8%


(36.0-47.0)


 


Mean Corpuscular Volume  97fL ()   97fL () 


 


Mean Corpuscular Hemoglobin  33pg (25-35)   33pg (25-35) 


 


Mean Corpuscular Hemoglobin


Concent 


  34g/dL (31-37) 


  


  34g/dL (31-37) 


 


 


Red Cell Distribution Width


  


  15.1%


(11.5-14.5) 


  15.2%


(11.5-14.5)


 


Platelet Count


  


  355x10^3/uL


(140-400) 


  307x10^3/uL


(140-400)


 


Neutrophils (%) (Auto)  71% (31-73)   62% (31-73) 


 


Lymphocytes (%) (Auto)  19% (24-48)   24% (24-48) 


 


Monocytes (%) (Auto)  8% (0-9)   12% (0-9) 


 


Eosinophils (%) (Auto)  1% (0-3)   2% (0-3) 


 


Basophils (%) (Auto)  0% (0-3)   1% (0-3) 


 


Neutrophils # (Auto)


  


  5.1x10^3uL


(1.8-7.7) 


  3.9x10^3uL


(1.8-7.7)


 


Lymphocytes # (Auto)


  


  1.4x10^3/uL


(1.0-4.8) 


  1.5x10^3/uL


(1.0-4.8)


 


Monocytes # (Auto)


  


  0.6x10^3/uL


(0.0-1.1) 


  0.7x10^3/uL


(0.0-1.1)


 


Eosinophils # (Auto)


  


  0.1x10^3/uL


(0.0-0.7) 


  0.1x10^3/uL


(0.0-0.7)


 


Basophils # (Auto)


  


  0.0x10^3/uL


(0.0-0.2) 


  0.0x10^3/uL


(0.0-0.2)


 


Prothrombin Time


  


  23.8SEC


(11.7-14.0) 


  26.2SEC


(11.7-14.0)


 


Prothromb Time International


Ratio 


  2.3 (0.8-1.1) 


  


  2.6 (0.8-1.1) 


 


 


D-Dimer (Ning)


  


  5.10ug/mlFEU


(0.00-0.50) 


  


 


 


Sodium Level


  


  141mmol/L


(136-145) 


  144mmol/L


(136-145)


 


Potassium Level


  


  3.8mmol/L


(3.5-5.1) 


  3.7mmol/L


(3.5-5.1)


 


Chloride Level


  


  105mmol/L


() 


  106mmol/L


()


 


Carbon Dioxide Level


  


  23mmol/L


(21-32) 


  28mmol/L


(21-32)


 


Anion Gap  13 (6-14)   10 (6-14) 


 


Blood Urea Nitrogen  21mg/dL (7-20)   17mg/dL (7-20) 


 


Creatinine


  


  0.9mg/dL


(0.6-1.0) 


  0.9mg/dL


(0.6-1.0)


 


Estimated GFR


(Cockcroft-Gault) 


  59.9 


  


  59.9 


 


 


BUN/Creatinine Ratio  23 (6-20)   


 


Glucose Level


  


  100mg/dL


(70-99) 


  102mg/dL


(70-99)


 


Calcium Level


  


  9.2mg/dL


(8.5-10.1) 


  8.7mg/dL


(8.5-10.1)


 


Magnesium Level


  


  1.4mg/dL


(1.8-2.4) 


  


 


 


Total Bilirubin


  


  0.9mg/dL


(0.2-1.0) 


  


 


 


Aspartate Amino Transf


(AST/SGOT) 


  19U/L (15-37) 


  


  


 


 


Alanine Aminotransferase


(ALT/SGPT) 


  23U/L (14-59) 


  


  


 


 


Alkaline Phosphatase  88U/L ()   


 


Troponin I Quantitative


  


  < 0.017ng/mL


(0.000-0.055) 


  < 0.017ng/mL


(0.000-0.055)


 


NT-Pro-B-Type Natriuretic


Peptide 


  2462pg/mL


(0-449) 


  


 


 


Total Protein


  


  7.9g/dL


(6.4-8.2) 


  


 


 


Albumin


  


  4.0g/dL


(3.4-5.0) 


  


 


 


Albumin/Globulin Ratio  1.0 (1.0-1.7)   














Test


  4/26/17


08:07 4/26/17


11:36 4/26/17


15:00 4/26/17


16:11


 


Glucose (Fingerstick)


  96mg/dL


(70-99) 118mg/dL


(70-99) 


  94mg/dL


(70-99)


 


Troponin I Quantitative


  


  


  < 0.017ng/mL


(0.000-0.055) 


 














Test


  4/27/17


04:30 4/27/17


07:33 


  


 


 


White Blood Count


  5.9x10^3/uL


(4.0-11.0) 


  


  


 


 


Red Blood Count


  3.47x10^6/uL


(3.50-5.40) 


  


  


 


 


Hemoglobin


  11.4g/dL


(12.0-15.5) 


  


  


 


 


Hematocrit


  33.4%


(36.0-47.0) 


  


  


 


 


Mean Corpuscular Volume 96fL ()    


 


Mean Corpuscular Hemoglobin 33pg (25-35)    


 


Mean Corpuscular Hemoglobin


Concent 34g/dL (31-37) 


  


  


  


 


 


Red Cell Distribution Width


  14.8%


(11.5-14.5) 


  


  


 


 


Platelet Count


  297x10^3/uL


(140-400) 


  


  


 


 


Neutrophils (%) (Auto) 62% (31-73)    


 


Lymphocytes (%) (Auto) 23% (24-48)    


 


Monocytes (%) (Auto) 13% (0-9)    


 


Eosinophils (%) (Auto) 1% (0-3)    


 


Basophils (%) (Auto) 0% (0-3)    


 


Neutrophils # (Auto)


  3.7x10^3uL


(1.8-7.7) 


  


  


 


 


Lymphocytes # (Auto)


  1.4x10^3/uL


(1.0-4.8) 


  


  


 


 


Monocytes # (Auto)


  0.7x10^3/uL


(0.0-1.1) 


  


  


 


 


Eosinophils # (Auto)


  0.1x10^3/uL


(0.0-0.7) 


  


  


 


 


Basophils # (Auto)


  0.0x10^3/uL


(0.0-0.2) 


  


  


 


 


Prothrombin Time


  27.0SEC


(11.7-14.0) 


  


  


 


 


Prothromb Time International


Ratio 2.7 (0.8-1.1) 


  


  


  


 


 


Sodium Level


  141mmol/L


(136-145) 


  


  


 


 


Potassium Level


  3.3mmol/L


(3.5-5.1) 


  


  


 


 


Chloride Level


  106mmol/L


() 


  


  


 


 


Carbon Dioxide Level


  28mmol/L


(21-32) 


  


  


 


 


Anion Gap 7 (6-14)    


 


Blood Urea Nitrogen 16mg/dL (7-20)    


 


Creatinine


  0.9mg/dL


(0.6-1.0) 


  


  


 


 


Estimated GFR


(Cockcroft-Gault) 59.9 


  


  


  


 


 


Glucose Level


  93mg/dL


(70-99) 


  


  


 


 


Calcium Level


  8.5mg/dL


(8.5-10.1) 


  


  


 


 


Magnesium Level


  1.5mg/dL


(1.8-2.4) 


  


  


 


 


Triglycerides Level


  79mg/dL


(0-150) 


  


  


 


 


Cholesterol Level


  129mg/dL


(0-200) 


  


  


 


 


LDL Cholesterol, Calculated


  74mg/dL


(0-100) 


  


  


 


 


VLDL Cholesterol, Calculated 16mg/dL (0-40)    


 


Non-HDL Cholesterol Calculated


  90mg/dL


(0-129) 


  


  


 


 


HDL Cholesterol


  39mg/dL


(40-60) 


  


  


 


 


Cholesterol/HDL Ratio 3.3    


 


Glucose (Fingerstick)


  


  101mg/dL


(70-99) 


  


 








Laboratory Tests








Test


  4/26/17


11:36 4/26/17


15:00 4/26/17


16:11 4/27/17


04:30


 


Glucose (Fingerstick)


  118mg/dL


(70-99) 


  94mg/dL


(70-99) 


 


 


Troponin I Quantitative


  


  < 0.017ng/mL


(0.000-0.055) 


  


 


 


White Blood Count


  


  


  


  5.9x10^3/uL


(4.0-11.0)


 


Red Blood Count


  


  


  


  3.47x10^6/uL


(3.50-5.40)


 


Hemoglobin


  


  


  


  11.4g/dL


(12.0-15.5)


 


Hematocrit


  


  


  


  33.4%


(36.0-47.0)


 


Mean Corpuscular Volume    96fL () 


 


Mean Corpuscular Hemoglobin    33pg (25-35) 


 


Mean Corpuscular Hemoglobin


Concent 


  


  


  34g/dL (31-37) 


 


 


Red Cell Distribution Width


  


  


  


  14.8%


(11.5-14.5)


 


Platelet Count


  


  


  


  297x10^3/uL


(140-400)


 


Neutrophils (%) (Auto)    62% (31-73) 


 


Lymphocytes (%) (Auto)    23% (24-48) 


 


Monocytes (%) (Auto)    13% (0-9) 


 


Eosinophils (%) (Auto)    1% (0-3) 


 


Basophils (%) (Auto)    0% (0-3) 


 


Neutrophils # (Auto)


  


  


  


  3.7x10^3uL


(1.8-7.7)


 


Lymphocytes # (Auto)


  


  


  


  1.4x10^3/uL


(1.0-4.8)


 


Monocytes # (Auto)


  


  


  


  0.7x10^3/uL


(0.0-1.1)


 


Eosinophils # (Auto)


  


  


  


  0.1x10^3/uL


(0.0-0.7)


 


Basophils # (Auto)


  


  


  


  0.0x10^3/uL


(0.0-0.2)


 


Prothrombin Time


  


  


  


  27.0SEC


(11.7-14.0)


 


Prothromb Time International


Ratio 


  


  


  2.7 (0.8-1.1) 


 


 


Sodium Level


  


  


  


  141mmol/L


(136-145)


 


Potassium Level


  


  


  


  3.3mmol/L


(3.5-5.1)


 


Chloride Level


  


  


  


  106mmol/L


()


 


Carbon Dioxide Level


  


  


  


  28mmol/L


(21-32)


 


Anion Gap    7 (6-14) 


 


Blood Urea Nitrogen    16mg/dL (7-20) 


 


Creatinine


  


  


  


  0.9mg/dL


(0.6-1.0)


 


Estimated GFR


(Cockcroft-Gault) 


  


  


  59.9 


 


 


Glucose Level


  


  


  


  93mg/dL


(70-99)


 


Calcium Level


  


  


  


  8.5mg/dL


(8.5-10.1)


 


Magnesium Level


  


  


  


  1.5mg/dL


(1.8-2.4)


 


Triglycerides Level


  


  


  


  79mg/dL


(0-150)


 


Cholesterol Level


  


  


  


  129mg/dL


(0-200)


 


LDL Cholesterol, Calculated


  


  


  


  74mg/dL


(0-100)


 


VLDL Cholesterol, Calculated    16mg/dL (0-40) 


 


Non-HDL Cholesterol Calculated


  


  


  


  90mg/dL


(0-129)


 


HDL Cholesterol


  


  


  


  39mg/dL


(40-60)


 


Cholesterol/HDL Ratio    3.3 














Test


  4/27/17


07:33 


  


  


 


 


Glucose (Fingerstick)


  101mg/dL


(70-99) 


  


  


 








Medications





 Current Medications


Dextrose (Dextrose 50%-Water Syringe) 12.5 gm PRN Q15MIN  PRN IV SEE COMMENTS;  

Start 4/25/17 at 15:45


Acetaminophen (Tylenol) 325 mg PRN Q4HRS  PRN PO MILD PAIN;  Start 4/25/17 at 16

:15


Amlodipine Besylate (Norvasc) 10 mg DAILY PO  Last administered on 4/27/17at 09:

00;  Start 4/26/17 at 09:00


Atenolol (Tenormin) 50 mg BID PO  Last administered on 4/27/17at 09:00;  Start 4 /25/17 at 21:00


Atorvastatin Calcium (Lipitor) 40 mg QHS PO  Last administered on 4/26/17at 21:

45;  Start 4/25/17 at 21:00


Furosemide (Lasix) 40 mg DAILY PO ;  Start 4/25/17 at 18:02;  Status Cancel


Glipizide (Glucotrol) 5 mg DAILYWBKFT PO  Last administered on 4/27/17at 09:00;

  Start 4/26/17 at 08:00


Warfarin Sodium (Coumadin) 2 mg DAILY16 PO  Last administered on 4/26/17at 16:23

;  Start 4/25/17 at 17:30


Losartan Potassium (Cozaar) 100 mg DAILY PO  Last administered on 4/27/17at 09:

01;  Start 4/26/17 at 09:00


Warfarin Sodium (Coumadin Per Physician) 1 each PRN DAILY  PRN MC SEE COMMENTS 

Last administered on 4/26/17at 13:44;  Start 4/25/17 at 17:15


Furosemide (Lasix) 40 mg DAILY IVP  Last administered on 4/27/17at 09:01;  

Start 4/26/17 at 09:00


Furosemide (Lasix) 40 mg 1X  ONCE IVP  Last administered on 4/25/17at 18:33;  

Start 4/25/17 at 18:15;  Stop 4/25/17 at 18:16;  Status DC


Furosemide (Lasix) 40 mg DAILY IVP ;  Start 4/26/17 at 10:00;  Stop 4/26/17 at 

10:00;  Status DC


Potassium Chloride (Klor-Con) 20 meq DAILY10 PO  Last administered on 4/27/17at 

09:00;  Start 4/26/17 at 10:00


Aspirin (Pedro Aspirin) 81 mg DAILYWBKFT PO ;  Start 4/26/17 at 10:00;  Stop 4/ 26/17 at 10:09;  Status DC


Aspirin (Children'S Aspirin) 81 mg DAILYWBKFT PO  Last administered on 4/27/ 17at 09:00;  Start 4/26/17 at 10:15


Aspirin (Children'S Aspirin) 81 mg 1X  ONCE PO  Last administered on 4/26/17at 

10:26;  Start 4/26/17 at 10:30;  Stop 4/26/17 at 10:31;  Status DC


Potassium Chloride 40 meq 40 meq 1X  ONCE PO  Last administered on 4/27/17at 08:

59;  Start 4/27/17 at 08:15;  Stop 4/27/17 at 08:16;  Status DC


Magnesium Sulfate/ Dextrose (Magnesium Sulfate PREMIX 2GM) 50 ml @ 25 mls/hr 1X

  ONCE IV  Last administered on 4/27/17at 08:59;  Start 4/27/17 at 09:00;  Stop 

4/27/17 at 10:59





Active Scripts


Active


Reported


Tylenol (Acetaminophen) 325 Mg Tablet 1-2 Tab PO PRN Q4HRS


Coumadin (Warfarin Sodium) 2 Mg Tablet 2 Mg PO DAILY


Atorvastatin Calcium 40 Mg Tablet 40 Mg PO DAILY


Losartan Potassium 100 Mg Tablet 100 Mg PO DAILY


Glipizide 5 Mg Tablet 5 Mg PO DAILY


Atenolol 50 Mg Tablet 50 Mg PO BID


Amlodipine Besylate 10 Mg Tablet 10 Mg PO DAILY


Furosemide 40 Mg/4 Ml Solution 40 Mg INJ DAILY


     FIRST DOSE NOW


Vitals/I & O





 Vital Sign - Last 24 Hours








 4/26/17 4/26/17 4/26/17 4/26/17





 11:30 15:00 19:00 20:12


 


Temp 97.9 96.6 97.6 





 97.9 96.6 97.6 


 


Pulse 78 73 104 


 


Resp 16 22 20 


 


B/P 131/61 137/66 133/59 


 


Pulse Ox 98 97 92 


 


O2 Delivery BiPAP/CPAP Nasal Cannula Nasal Cannula Nasal Cannula


 


O2 Flow Rate 2.0 4.0  2.0


 


    





    





 4/26/17 4/26/17 4/27/17 4/27/17





 21:45 23:00 03:12 07:00


 


Temp  97.6 97.5 96.3





  97.6 97.5 96.3


 


Pulse 104 76 69 62


 


Resp  20 20 20


 


B/P 133/59 133/67 140/60 145/60


 


Pulse Ox  91 96 91


 


O2 Delivery  Nasal Cannula Nasal Cannula Nasal Cannula


 


O2 Flow Rate    2.0


 


    





    





 4/27/17 4/27/17 4/27/17 





 09:00 09:00 09:01 


 


Pulse 62 62 62 


 


B/P 145/60 145/60 145/60 














 Intake and Output   


 


 4/26/17 4/26/17 4/27/17





 14:59 22:59 06:59


 


Intake Total 690 ml 210 ml 


 


Output Total  1700 ml 300 ml


 


Balance 690 ml -1490 ml -300 ml














ADRIANA TENORIO MD Apr 27, 2017 09:32

## 2017-04-27 NOTE — CONS
DATE OF CONSULTATION:  04/26/2017



ATTENDING PHYSICIAN:  Dr. Terrell.



DICTATING PHYSICIAN:  Dr. Chen.



REASON FOR CONSULTATION:  The patient is seen in pulmonary consultation at the

request of Dr. Terrell for increasing shortness of air and abnormal chest x-ray.



HISTORY OF PRESENT ILLNESS:  The patient is an 82-year-old female that presented

to Dr. Terrell's office with increasing shortness of breath over the last week,

paroxysmal nocturnal dyspnea, pedal edema.  She has been started on

hydrochlorothiazide a week prior to her presentation with no significant

improvement.  Now she presented with increasing symptoms.  She was admitted.  I

was asked to see her in consultation.  Chest x-ray was obtained.  I personally

reviewed the x-ray, which revealed bilateral infiltrates compatible with CHF. 

Echocardiogram revealed a pulmonary artery pressure of 68, left ventricular

ejection fraction of 50-55%.  She had venous Doppler of lower extremities, which

were negative.  CT confirmed bilateral effusions.



PAST MEDICAL HISTORY:  Chronic atrial fibrillation on anticoagulation, diabetes

type 2, hyperlipidemia.



PAST SURGICAL HISTORY:  Status post cataract removal, polypectomy.



SOCIAL HISTORY:  She has never smoked.  Denies any alcohol intake.



FAMILY HISTORY:  Diabetes, breast cancer.



REVIEW OF SYSTEMS:  As indicated above, otherwise, a 10-point system was

reviewed and negative.  Sleep hygiene reveals that she awakens unrefreshed from

her sleep.  No significant apneic spells have been witnessed.  She does not

describe excessive daytime sleepiness.



CURRENT MEDICATIONS:  List was reviewed.



PHYSICAL EXAMINATION:

GENERAL:  Obese individual with a body mass index of 29 in no significant

respiratory distress.

VITAL SIGNS:  Stable.  O2 saturation greater than 92%, currently on 2 liters.

HEENT:  Eyes, the sclerae were nonicteric.

NECK:  Jugular venous distention ____ not be assessed secondary to body habitus.

CHEST:  Full expansion.

LUNGS:  Diminished breath sounds in the bases.  No wheezes.

CARDIOVASCULAR:  Regular rate and rhythm with S1, S2, no S3.

ABDOMEN:  Soft, nontender, nondistended.

EXTREMITIES:  No clubbing, cyanosis, some edema.

NEUROLOGIC:  The patient was awake, alert, following commands.  A detailed neuro

exam was not performed.



LABORATORY DATA:  Reviewed.  Chest x-ray reviewed as indicated above.  CT was

reviewed.



IMPRESSION:

1.  Acute respiratory failure secondary to acute heart failure mostly diastolic.

 Echocardiogram reveals normal systolic function.

2.  Secondary pulmonary hypertension.

3.  Suspect obstructive sleep apnea.

4.  Persistent atrial fibrillation.

5.  Hyperlipidemia.

6.  Type 2 diabetes.

7.  Abnormal x-ray compatible with congestive heart failure infusions.



PLAN:

1.  Continue diuresis.

2.  Outpatient polysomnogram.

3.  Avoids salt intake.



Dr. Terrell, I do appreciate the privilege in sharing in the patient's care.

 



______________________________

FRANK CHEN MD



DR:  MARIAM/hilario  JOB#:  [id number] / [id number]

DD:  04/26/2017 17:32  DT:  04/27/2017 05:39

## 2017-04-28 NOTE — PDOC
PULMONARY PROGRESS NOTES


Subjective


PT FEELS BETTER


Vitals





 Vital Signs








  Date Time  Temp Pulse Resp B/P Pulse Ox O2 Delivery O2 Flow Rate FiO2


 


4/28/17 11:03 97.9 77 15 146/71 97 Nasal Cannula 2.0 





 97.9       








General:  Alert


Lungs:  Clear, Crackles


Cardiovascular:  S1, S2


Abdomen:  Soft


Neuro Exam:  Alert


Extremities:  Other (EDEMA)


Skin:  Warm


Labs





Laboratory Tests








Test


  4/26/17


15:00 4/26/17


16:11 4/27/17


04:30 4/27/17


07:33


 


Troponin I Quantitative


  < 0.017ng/mL


(0.000-0.055) 


  


  


 


 


Glucose (Fingerstick)


  


  94mg/dL


(70-99) 


  101mg/dL


(70-99)


 


White Blood Count


  


  


  5.9x10^3/uL


(4.0-11.0) 


 


 


Red Blood Count


  


  


  3.47x10^6/uL


(3.50-5.40) 


 


 


Hemoglobin


  


  


  11.4g/dL


(12.0-15.5) 


 


 


Hematocrit


  


  


  33.4%


(36.0-47.0) 


 


 


Mean Corpuscular Volume   96fL ()  


 


Mean Corpuscular Hemoglobin   33pg (25-35)  


 


Mean Corpuscular Hemoglobin


Concent 


  


  34g/dL (31-37) 


  


 


 


Red Cell Distribution Width


  


  


  14.8%


(11.5-14.5) 


 


 


Platelet Count


  


  


  297x10^3/uL


(140-400) 


 


 


Neutrophils (%) (Auto)   62% (31-73)  


 


Lymphocytes (%) (Auto)   23% (24-48)  


 


Monocytes (%) (Auto)   13% (0-9)  


 


Eosinophils (%) (Auto)   1% (0-3)  


 


Basophils (%) (Auto)   0% (0-3)  


 


Neutrophils # (Auto)


  


  


  3.7x10^3uL


(1.8-7.7) 


 


 


Lymphocytes # (Auto)


  


  


  1.4x10^3/uL


(1.0-4.8) 


 


 


Monocytes # (Auto)


  


  


  0.7x10^3/uL


(0.0-1.1) 


 


 


Eosinophils # (Auto)


  


  


  0.1x10^3/uL


(0.0-0.7) 


 


 


Basophils # (Auto)


  


  


  0.0x10^3/uL


(0.0-0.2) 


 


 


Prothrombin Time


  


  


  27.0SEC


(11.7-14.0) 


 


 


Prothromb Time International


Ratio 


  


  2.7 (0.8-1.1) 


  


 


 


Sodium Level


  


  


  141mmol/L


(136-145) 


 


 


Potassium Level


  


  


  3.3mmol/L


(3.5-5.1) 


 


 


Chloride Level


  


  


  106mmol/L


() 


 


 


Carbon Dioxide Level


  


  


  28mmol/L


(21-32) 


 


 


Anion Gap   7 (6-14)  


 


Blood Urea Nitrogen   16mg/dL (7-20)  


 


Creatinine


  


  


  0.9mg/dL


(0.6-1.0) 


 


 


Estimated GFR


(Cockcroft-Gault) 


  


  59.9 


  


 


 


Glucose Level


  


  


  93mg/dL


(70-99) 


 


 


Calcium Level


  


  


  8.5mg/dL


(8.5-10.1) 


 


 


Magnesium Level


  


  


  1.5mg/dL


(1.8-2.4) 


 


 


Triglycerides Level


  


  


  79mg/dL


(0-150) 


 


 


Cholesterol Level


  


  


  129mg/dL


(0-200) 


 


 


LDL Cholesterol, Calculated


  


  


  74mg/dL


(0-100) 


 


 


VLDL Cholesterol, Calculated   16mg/dL (0-40)  


 


Non-HDL Cholesterol Calculated


  


  


  90mg/dL


(0-129) 


 


 


HDL Cholesterol


  


  


  39mg/dL


(40-60) 


 


 


Cholesterol/HDL Ratio   3.3  


 


Thyroid Stimulating Hormone


(TSH) 


  


  2.026uIU/mL


(0.358-3.74) 


 














Test


  4/27/17


11:15 4/27/17


16:14 4/27/17


20:26 4/28/17


04:10


 


Glucose (Fingerstick)


  153mg/dL


(70-99) 79mg/dL


(70-99) 177mg/dL


(70-99) 


 


 


Prothrombin Time


  


  


  


  22.9SEC


(11.7-14.0)


 


Prothromb Time International


Ratio 


  


  


  2.2 (0.8-1.1) 


 


 


Sodium Level


  


  


  


  140mmol/L


(136-145)


 


Potassium Level


  


  


  


  3.5mmol/L


(3.5-5.1)


 


Chloride Level


  


  


  


  105mmol/L


()


 


Carbon Dioxide Level


  


  


  


  28mmol/L


(21-32)


 


Anion Gap    7 (6-14) 


 


Blood Urea Nitrogen    17mg/dL (7-20) 


 


Creatinine


  


  


  


  0.9mg/dL


(0.6-1.0)


 


Estimated GFR


(Cockcroft-Gault) 


  


  


  59.9 


 


 


Glucose Level


  


  


  


  95mg/dL


(70-99)


 


Calcium Level


  


  


  


  8.9mg/dL


(8.5-10.1)


 


Magnesium Level


  


  


  


  1.8mg/dL


(1.8-2.4)














Test


  4/28/17


07:51 


  


  


 


 


Glucose (Fingerstick)


  96mg/dL


(70-99) 


  


  


 








Laboratory Tests








Test


  4/27/17


16:14 4/27/17


20:26 4/28/17


04:10 4/28/17


07:51


 


Glucose (Fingerstick)


  79mg/dL


(70-99) 177mg/dL


(70-99) 


  96mg/dL


(70-99)


 


Prothrombin Time


  


  


  22.9SEC


(11.7-14.0) 


 


 


Prothromb Time International


Ratio 


  


  2.2 (0.8-1.1) 


  


 


 


Sodium Level


  


  


  140mmol/L


(136-145) 


 


 


Potassium Level


  


  


  3.5mmol/L


(3.5-5.1) 


 


 


Chloride Level


  


  


  105mmol/L


() 


 


 


Carbon Dioxide Level


  


  


  28mmol/L


(21-32) 


 


 


Anion Gap   7 (6-14)  


 


Blood Urea Nitrogen   17mg/dL (7-20)  


 


Creatinine


  


  


  0.9mg/dL


(0.6-1.0) 


 


 


Estimated GFR


(Cockcroft-Gault) 


  


  59.9 


  


 


 


Glucose Level


  


  


  95mg/dL


(70-99) 


 


 


Calcium Level


  


  


  8.9mg/dL


(8.5-10.1) 


 


 


Magnesium Level


  


  


  1.8mg/dL


(1.8-2.4) 


 








Medications





Active Scripts








 Medications  Dose


 Route/Sig Days Date Category Dose


Instructions


 


 Tylenol


  (Acetaminophen)


 325 Mg Tablet  1-2 Tab


 PO PRN Q4HRS   4/25/17 Reported 


 


 Coumadin


  (Warfarin Sodium)


 2 Mg Tablet  2 Mg


 PO DAILY   4/25/17 Reported 


 


 Atorvastatin


 Calcium 40 Mg


 Tablet  40 Mg


 PO DAILY   4/25/17 Reported 


 


 Losartan


 Potassium 100 Mg


 Tablet  100 Mg


 PO DAILY   4/25/17 Reported 


 


 Glipizide 5 Mg


 Tablet  5 Mg


 PO DAILY   4/25/17 Reported 


 


 Atenolol 50 Mg


 Tablet  50 Mg


 PO BID   4/25/17 Reported 


 


 Amlodipine


 Besylate 10 Mg


 Tablet  10 Mg


 PO DAILY   4/25/17 Reported 


 


 Furosemide 40


 Mg/4 Ml Solution  40 Mg


 INJ DAILY   4/25/17 Reported  FIRST DOSE NOW











Impression


.





IMPRESSION:


1.  Acute respiratory failure secondary to acute heart failure mostly diastolic.


 Echocardiogram reveals normal systolic function.


2.  Secondary pulmonary hypertension.


3.  Suspect obstructive sleep apnea.


4.  Persistent atrial fibrillation.


5.  Hyperlipidemia.


6.  Type 2 diabetes.


7.  Abnormal x-ray compatible with congestive heart failure infusions.





Plan


.


PT TO D/C TODAY


OUT PT SLEEP STUDY


DIURESE


6 MINUTE WALK NO NEED FOR 02


AT TIMES ECHO OVERESTIMATES THE PAP


MAY NEED A RIGHT HEART CATH TO CONFIRM PAP








FRANK CHEN MD Apr 28, 2017 14:00

## 2017-04-28 NOTE — PDOC
Provider Note


Provider Note


discharge summary dictated  #  [id number]








ADRIANA TENORIO MD Apr 28, 2017 10:45

## 2017-04-28 NOTE — DS
DATE OF DISCHARGE:  04/28/2017



CONSULTANTS:  Dr. Jorge, Dr. Lombardo, and Dr. Wisdom.



FINAL DIAGNOSES:

1.  Acute diastolic congestive heart failure.

2.  Severe pulmonary hypertension.

3.  Coronary artery disease manifested by coronary calcifications on a CAT scan

of the chest.

4.  Persistent atrial fibrillation treated with Coumadin.

5.  Hypertension.

6.  Hyperlipidemia.

7.  Hypokalemia.

8.  Hypomagnesemia.

9.  Diabetes mellitus type 2 with microalbuminuria.

10.  Bilateral pleural effusion secondary to acute diastolic congestive heart

failure.



HOSPITAL COURSE:  The patient is an 82-year-old white female with a history of

persistent atrial fibrillation treated with Coumadin, who has diabetes mellitus

type 2 with microalbuminuria, history of hypertension, admitted to [facility] from my office on 04/25/2017 with a 1-week history of dyspnea on

exertion and lower extremity edema.  The patient was admitted to the hospital

and had a chest x-ray consistent with congestive heart failure.  CAT scan of the

chest showed bilateral pleural effusions with passive atelectasis.  An

echocardiogram showed a preserved left ventricular ejection fraction with severe

pulmonary hypertension.  The patient was seen by Dr. Jorge for Pulmonary and

Dr. Wisdom and Dr. Lombardo for Cardiology.  The patient's INR was

therapeutic, and her Coumadin was continued.  She was treated with IV Lasix and

eventually switched to oral Lasix, and her issue of shortness of breath

resolved.  She is on oxygen 1 liter per nasal cannula.  She diuresed quite well.

 We will check an oxygen saturation on room air at rest and walking to see if we

can discontinue the oxygen.  She did have some hypokalemia and hypomagnesemia,

which resolved with repletion.  She was switched to oral Lasix on the day of

dismissal.  She will be dismissed to home on Coumadin 2 mg every day, amlodipine

10 mg every day, and atenolol 25 mg b.i.d.  She will be dismissed on glipizide 5

mg every day, furosemide 40 mg every day, potassium chloride 20 mEq b.i.d.,

losartan 100 mg every day, and lovastatin 40 mg b.i.d.  Her metformin was

discontinued due to the congestive heart failure.  Her blood sugars were under

good control with glipizide 5 mg every day.  Dismissed on Prilosec 20 mg every

day p.r.n., tramadol 50 mg every 6 hours p.r.n., and she will follow up in the

office next week and also get a prothrombin time and INR done in the office next

week when she returns.  She will be followed up with the cardiologist in a few

weeks and have a repeat echocardiogram.  Her pulmonary artery pressures are

still high.  She might need a right heart cardiac catheterization.  She will

stick to a cardiac diabetic diet.

 



______________________________

ADRIANA TENORIO MD



DR:  Hai  JOB#:  [id number] / [id number]

DD:  04/28/2017 10:45  DT:  04/28/2017 18:20

## 2017-04-28 NOTE — DISCH
DISCHARGE INSTRUCTIONS


Condition on Discharge


Condition on Discharge:  Stable





Activity After Discharge


Activity Instructions for Disc:  No restrictions





Diet after Discharge


Diet after Discharge:  Cardiac, Diabetic No Calorie Level





Contacting the  after DC


Call your doctor for:  If your condition worsens





Follow-Up


Follow up with:  dr. tenorio next week





Warfarin Follow-Up


Warfarin Follow UP:  next week with dr. tenorio office visit








ADRIANA TENORIO MD Apr 28, 2017 10:46

## 2017-04-28 NOTE — PDOC
CARDIO Progress Notes


Date and Time


Date of Service


4/28/2017


Time of Evaluation


1130





Subjective


Subjective:  No Chest Pain, No shortness of breath, No Palpitations, No 

Dizziness





Vitals


Vitals





 Vital Signs








  Date Time  Temp Pulse Resp B/P Pulse Ox O2 Delivery O2 Flow Rate FiO2


 


4/28/17 11:03 97.9 77 15 146/71 97 Nasal Cannula 2.0 





 97.9       








Weight


Weight [ ]





Input and Output


Intake and Output











 Intake and Output 


 


 4/28/17





 07:00


 


Intake Total 1450 ml


 


Output Total 800 ml


 


Balance 650 ml


 


 


 


Intake Oral 1450 ml


 


Output Urine Total 800 ml


 


# Voids 2











Laboratory


Labs





Laboratory Tests








Test


  4/27/17


16:14 4/27/17


20:26 4/28/17


04:10 4/28/17


07:51


 


Glucose (Fingerstick)


  79mg/dL


(70-99) 177mg/dL


(70-99) 


  96mg/dL


(70-99)


 


Prothrombin Time


  


  


  22.9SEC


(11.7-14.0) 


 


 


Prothromb Time International


Ratio 


  


  2.2 (0.8-1.1) 


  


 


 


Sodium Level


  


  


  140mmol/L


(136-145) 


 


 


Potassium Level


  


  


  3.5mmol/L


(3.5-5.1) 


 


 


Chloride Level


  


  


  105mmol/L


() 


 


 


Carbon Dioxide Level


  


  


  28mmol/L


(21-32) 


 


 


Anion Gap   7 (6-14)  


 


Blood Urea Nitrogen   17mg/dL (7-20)  


 


Creatinine


  


  


  0.9mg/dL


(0.6-1.0) 


 


 


Estimated GFR


(Cockcroft-Gault) 


  


  59.9 


  


 


 


Glucose Level


  


  


  95mg/dL


(70-99) 


 


 


Calcium Level


  


  


  8.9mg/dL


(8.5-10.1) 


 


 


Magnesium Level


  


  


  1.8mg/dL


(1.8-2.4) 


 











Physical Exam


HEENT:  Neck Supple W Full Motion


Chest:  Symmetric


LUNGS:  Other (diminished bases)


Heart:  S1S2, RRR (AFIB rate controlled)


Abdomen:  Soft N/T


Extremities:  No Calf Tenderness, Other (trace LE edema)


Neurology:  alert, oriented, follow commands





Assessment


Assessment


1. Acute CHF diastolic dysfunction: improved.  TTE with normal wall motion and 

preserved EF. Suspect from #2. 


2. Severe pulmonary HTN: nonsmoker. suspect associated KIEL.  Pulmonary 

following 


3. Chronic AFIB: rate controlled. no further bradycardia after decrease atenolol


3. HTN: controlled 


4. HLP: statin


5. Hypokalemia/Hypomagnesemia: resolved





Recommendations


1. Continue with coumadin per PCP.  


2  PO lasix therapy and K supplement


3. Will repeat TTE prior to next follow up and will consider for possible RHC


4. OK to DC per cardiac standpoint. Follow up in 3 weeks








MARGARITA SINGER Apr 28, 2017 11:48

## 2017-04-28 NOTE — PDOC
PROGRESS NOTES


Subjective


Subjective


feels well. not short of breath. ready for dismissal. lab reviewed.





Objective


Objective





 Vital Signs








  Date Time  Temp Pulse Resp B/P Pulse Ox O2 Delivery O2 Flow Rate FiO2


 


4/28/17 08:59  89  131/84    


 


4/28/17 07:00 97.7  18  97 Nasal Cannula 2.0 





 97.7       














 Intake and Output 


 


 4/28/17





 07:00


 


Intake Total 1450 ml


 


Output Total 800 ml


 


Balance 650 ml


 


 


 


Intake Oral 1450 ml


 


Output Urine Total 800 ml


 


# Voids 2











Physical Exam


Abdomen:  Soft


Heart:  Regular rate, Normal S1, Normal S2


Extremities:  No edema


General:  Alert


HEENT:  Atraumatic


Lungs:  Clear to auscultation


Neuro:  Normal speech


Psych/Mental Status:  Mental status NL


Skin:  No rashes





Assessment


Assessment


 Problems


Medical Problems:1.  Acute diastolic congestive heart failure compensated


2.  Coronary artery disease manifested by coronary calcifications on ct chest


3.  Persistent atrial fibrillation, on Coumadin.


4.  Hypertension.


5.  Hyperlipidemia.


6.  Diabetes mellitus type 2 with microalbuminuria.


7.  Bilateral pleural effusions, most likely secondary to congestive heart


failure.


8.  Bilateral lower lobe atelectasis, most likely secondary to pleural


effusions


    severe pulmonary hypertension. rule out KIEL other cause


    hypokalemia resolved


    hypomagnesemia resolved


(1) CHF (congestive heart failure)


Status: Acute  








Plan


Plan of Care


switch to oral lasix


dismiss today





Comment


Review of Relevant


I have reviewed the following items arianna (where applicable) has been applied.


Labs





Laboratory Tests








Test


  4/26/17


11:36 4/26/17


15:00 4/26/17


16:11 4/27/17


04:30


 


Glucose (Fingerstick)


  118mg/dL


(70-99) 


  94mg/dL


(70-99) 


 


 


Troponin I Quantitative


  


  < 0.017ng/mL


(0.000-0.055) 


  


 


 


White Blood Count


  


  


  


  5.9x10^3/uL


(4.0-11.0)


 


Red Blood Count


  


  


  


  3.47x10^6/uL


(3.50-5.40)


 


Hemoglobin


  


  


  


  11.4g/dL


(12.0-15.5)


 


Hematocrit


  


  


  


  33.4%


(36.0-47.0)


 


Mean Corpuscular Volume    96fL () 


 


Mean Corpuscular Hemoglobin    33pg (25-35) 


 


Mean Corpuscular Hemoglobin


Concent 


  


  


  34g/dL (31-37) 


 


 


Red Cell Distribution Width


  


  


  


  14.8%


(11.5-14.5)


 


Platelet Count


  


  


  


  297x10^3/uL


(140-400)


 


Neutrophils (%) (Auto)    62% (31-73) 


 


Lymphocytes (%) (Auto)    23% (24-48) 


 


Monocytes (%) (Auto)    13% (0-9) 


 


Eosinophils (%) (Auto)    1% (0-3) 


 


Basophils (%) (Auto)    0% (0-3) 


 


Neutrophils # (Auto)


  


  


  


  3.7x10^3uL


(1.8-7.7)


 


Lymphocytes # (Auto)


  


  


  


  1.4x10^3/uL


(1.0-4.8)


 


Monocytes # (Auto)


  


  


  


  0.7x10^3/uL


(0.0-1.1)


 


Eosinophils # (Auto)


  


  


  


  0.1x10^3/uL


(0.0-0.7)


 


Basophils # (Auto)


  


  


  


  0.0x10^3/uL


(0.0-0.2)


 


Prothrombin Time


  


  


  


  27.0SEC


(11.7-14.0)


 


Prothromb Time International


Ratio 


  


  


  2.7 (0.8-1.1) 


 


 


Sodium Level


  


  


  


  141mmol/L


(136-145)


 


Potassium Level


  


  


  


  3.3mmol/L


(3.5-5.1)


 


Chloride Level


  


  


  


  106mmol/L


()


 


Carbon Dioxide Level


  


  


  


  28mmol/L


(21-32)


 


Anion Gap    7 (6-14) 


 


Blood Urea Nitrogen    16mg/dL (7-20) 


 


Creatinine


  


  


  


  0.9mg/dL


(0.6-1.0)


 


Estimated GFR


(Cockcroft-Gault) 


  


  


  59.9 


 


 


Glucose Level


  


  


  


  93mg/dL


(70-99)


 


Calcium Level


  


  


  


  8.5mg/dL


(8.5-10.1)


 


Magnesium Level


  


  


  


  1.5mg/dL


(1.8-2.4)


 


Triglycerides Level


  


  


  


  79mg/dL


(0-150)


 


Cholesterol Level


  


  


  


  129mg/dL


(0-200)


 


LDL Cholesterol, Calculated


  


  


  


  74mg/dL


(0-100)


 


VLDL Cholesterol, Calculated    16mg/dL (0-40) 


 


Non-HDL Cholesterol Calculated


  


  


  


  90mg/dL


(0-129)


 


HDL Cholesterol


  


  


  


  39mg/dL


(40-60)


 


Cholesterol/HDL Ratio    3.3 


 


Thyroid Stimulating Hormone


(TSH) 


  


  


  2.026uIU/mL


(0.358-3.74)














Test


  4/27/17


07:33 4/27/17


11:15 4/27/17


16:14 4/27/17


20:26


 


Glucose (Fingerstick)


  101mg/dL


(70-99) 153mg/dL


(70-99) 79mg/dL


(70-99) 177mg/dL


(70-99)














Test


  4/28/17


04:10 4/28/17


07:51 


  


 


 


Prothrombin Time


  22.9SEC


(11.7-14.0) 


  


  


 


 


Prothromb Time International


Ratio 2.2 (0.8-1.1) 


  


  


  


 


 


Sodium Level


  140mmol/L


(136-145) 


  


  


 


 


Potassium Level


  3.5mmol/L


(3.5-5.1) 


  


  


 


 


Chloride Level


  105mmol/L


() 


  


  


 


 


Carbon Dioxide Level


  28mmol/L


(21-32) 


  


  


 


 


Anion Gap 7 (6-14)    


 


Blood Urea Nitrogen 17mg/dL (7-20)    


 


Creatinine


  0.9mg/dL


(0.6-1.0) 


  


  


 


 


Estimated GFR


(Cockcroft-Gault) 59.9 


  


  


  


 


 


Glucose Level


  95mg/dL


(70-99) 


  


  


 


 


Calcium Level


  8.9mg/dL


(8.5-10.1) 


  


  


 


 


Magnesium Level


  1.8mg/dL


(1.8-2.4) 


  


  


 


 


Glucose (Fingerstick)


  


  96mg/dL


(70-99) 


  


 








Laboratory Tests








Test


  4/27/17


11:15 4/27/17


16:14 4/27/17


20:26 4/28/17


04:10


 


Glucose (Fingerstick)


  153mg/dL


(70-99) 79mg/dL


(70-99) 177mg/dL


(70-99) 


 


 


Prothrombin Time


  


  


  


  22.9SEC


(11.7-14.0)


 


Prothromb Time International


Ratio 


  


  


  2.2 (0.8-1.1) 


 


 


Sodium Level


  


  


  


  140mmol/L


(136-145)


 


Potassium Level


  


  


  


  3.5mmol/L


(3.5-5.1)


 


Chloride Level


  


  


  


  105mmol/L


()


 


Carbon Dioxide Level


  


  


  


  28mmol/L


(21-32)


 


Anion Gap    7 (6-14) 


 


Blood Urea Nitrogen    17mg/dL (7-20) 


 


Creatinine


  


  


  


  0.9mg/dL


(0.6-1.0)


 


Estimated GFR


(Cockcroft-Gault) 


  


  


  59.9 


 


 


Glucose Level


  


  


  


  95mg/dL


(70-99)


 


Calcium Level


  


  


  


  8.9mg/dL


(8.5-10.1)


 


Magnesium Level


  


  


  


  1.8mg/dL


(1.8-2.4)














Test


  4/28/17


07:51 


  


  


 


 


Glucose (Fingerstick)


  96mg/dL


(70-99) 


  


  


 








Medications





 Current Medications


Dextrose (Dextrose 50%-Water Syringe) 12.5 gm PRN Q15MIN  PRN IV SEE COMMENTS;  

Start 4/25/17 at 15:45


Acetaminophen (Tylenol) 325 mg PRN Q4HRS  PRN PO MILD PAIN;  Start 4/25/17 at 16

:15


Amlodipine Besylate (Norvasc) 10 mg DAILY PO  Last administered on 4/28/17at 08:

59;  Start 4/26/17 at 09:00


Atenolol (Tenormin) 50 mg BID PO  Last administered on 4/27/17at 09:00;  Start 4 /25/17 at 21:00;  Stop 4/27/17 at 12:11;  Status DC


Atorvastatin Calcium (Lipitor) 40 mg QHS PO  Last administered on 4/27/17at 20:

33;  Start 4/25/17 at 21:00


Furosemide (Lasix) 40 mg DAILY PO ;  Start 4/25/17 at 18:02;  Status Cancel


Glipizide (Glucotrol) 5 mg DAILYWBKFT PO  Last administered on 4/28/17at 08:40;

  Start 4/26/17 at 08:00


Warfarin Sodium (Coumadin) 2 mg DAILY16 PO  Last administered on 4/27/17at 16:49

;  Start 4/25/17 at 17:30


Losartan Potassium (Cozaar) 100 mg DAILY PO  Last administered on 4/28/17at 08:

41;  Start 4/26/17 at 09:00


Warfarin Sodium (Coumadin Per Physician) 1 each PRN DAILY  PRN MC SEE COMMENTS 

Last administered on 4/27/17at 14:42;  Start 4/25/17 at 17:15


Furosemide (Lasix) 40 mg DAILY IVP  Last administered on 4/28/17at 08:41;  

Start 4/26/17 at 09:00


Furosemide (Lasix) 40 mg 1X  ONCE IVP  Last administered on 4/25/17at 18:33;  

Start 4/25/17 at 18:15;  Stop 4/25/17 at 18:16;  Status DC


Furosemide (Lasix) 40 mg DAILY IVP ;  Start 4/26/17 at 10:00;  Stop 4/26/17 at 

10:00;  Status DC


Potassium Chloride (Klor-Con) 20 meq DAILY10 PO  Last administered on 4/27/17at 

09:00;  Start 4/26/17 at 10:00;  Stop 4/27/17 at 09:27;  Status DC


Aspirin (Pedro Aspirin) 81 mg DAILYWBKFT PO ;  Start 4/26/17 at 10:00;  Stop 4/ 26/17 at 10:09;  Status DC


Aspirin (Children'S Aspirin) 81 mg DAILYWBKFT PO  Last administered on 4/28/ 17at 08:40;  Start 4/26/17 at 10:15


Aspirin (Children'S Aspirin) 81 mg 1X  ONCE PO  Last administered on 4/26/17at 

10:26;  Start 4/26/17 at 10:30;  Stop 4/26/17 at 10:31;  Status DC


Potassium Chloride 40 meq 40 meq 1X  ONCE PO  Last administered on 4/27/17at 08:

59;  Start 4/27/17 at 08:15;  Stop 4/27/17 at 08:16;  Status DC


Magnesium Sulfate/ Dextrose (Magnesium Sulfate PREMIX 2GM) 50 ml @ 25 mls/hr 1X

  ONCE IV  Last administered on 4/27/17at 08:59;  Start 4/27/17 at 09:00;  Stop 

4/27/17 at 09:36;  Status DC


Potassium Chloride (Klor-Con) 20 meq BID PO ;  Start 4/27/17 at 21:00;  Status 

Cancel


Potassium Chloride 40 meq 40 meq 1X  ONCE PO ;  Start 4/27/17 at 09:30;  Stop 4/ 27/17 at 09:36;  Status DC


Magnesium Sulfate/ Dextrose (Magnesium Sulfate PREMIX 2GM) 50 ml @ 25 mls/hr 1X

  ONCE IV ;  Start 4/27/17 at 09:30;  Stop 4/27/17 at 11:29;  Status DC


Potassium Chloride (Klor-Con) 20 meq BIDWMEALS PO  Last administered on 4/28/ 17at 08:41;  Start 4/27/17 at 10:30


Atenolol (Tenormin) 25 mg BID PO  Last administered on 4/28/17at 08:42;  Start 4 /27/17 at 21:00





Active Scripts


Active


Reported


Tylenol (Acetaminophen) 325 Mg Tablet 1-2 Tab PO PRN Q4HRS


Coumadin (Warfarin Sodium) 2 Mg Tablet 2 Mg PO DAILY


Atorvastatin Calcium 40 Mg Tablet 40 Mg PO DAILY


Losartan Potassium 100 Mg Tablet 100 Mg PO DAILY


Glipizide 5 Mg Tablet 5 Mg PO DAILY


Atenolol 50 Mg Tablet 50 Mg PO BID


Amlodipine Besylate 10 Mg Tablet 10 Mg PO DAILY


Furosemide 40 Mg/4 Ml Solution 40 Mg INJ DAILY


     FIRST DOSE NOW


Vitals/I & O





 Vital Sign - Last 24 Hours








 4/27/17 4/27/17 4/27/17 4/27/17





 11:00 15:00 19:00 20:00


 


Temp 96.4 96.8 95.7 





 96.4 96.8 95.7 


 


Pulse 76 78 79 


 


Resp 22 20 18 


 


B/P 128/53 132/65 153/61 


 


Pulse Ox 99 96 98 


 


O2 Delivery Nasal Cannula Nasal Cannula Nasal Cannula Nasal Cannula


 


O2 Flow Rate 2.0 2.0 2.0 4.0


 


    





    





 4/27/17 4/27/17 4/28/17 4/28/17





 20:35 23:00 03:00 07:00


 


Temp  95.7 95.7 97.7





  95.7 95.7 97.7


 


Pulse 79 75 85 89


 


Resp  18 18 18


 


B/P 153/61 139/50 142/60 131/84


 


Pulse Ox  96 97 97


 


O2 Delivery  Nasal Cannula Nasal Cannula Nasal Cannula


 


O2 Flow Rate  2.0 2.0 2.0


 


    





    





 4/28/17 4/28/17 4/28/17 





 08:41 08:42 08:59 


 


Pulse 89 89 89 


 


B/P 131/84 131/84 131/84 














 Intake and Output   


 


 4/27/17 4/27/17 4/28/17





 15:00 23:00 07:00


 


Intake Total 500 ml 950 ml 


 


Output Total  800 ml 


 


Balance 500 ml 150 ml 














ADRIANA TENORIO MD Apr 28, 2017 10:40